# Patient Record
Sex: FEMALE | Race: OTHER | Employment: FULL TIME | URBAN - METROPOLITAN AREA
[De-identification: names, ages, dates, MRNs, and addresses within clinical notes are randomized per-mention and may not be internally consistent; named-entity substitution may affect disease eponyms.]

---

## 2021-11-01 ENCOUNTER — APPOINTMENT (OUTPATIENT)
Dept: CT IMAGING | Facility: HOSPITAL | Age: 51
End: 2021-11-01
Attending: EMERGENCY MEDICINE
Payer: COMMERCIAL

## 2021-11-01 ENCOUNTER — HOSPITAL ENCOUNTER (EMERGENCY)
Facility: HOSPITAL | Age: 51
Discharge: HOME | End: 2021-11-01
Attending: EMERGENCY MEDICINE
Payer: COMMERCIAL

## 2021-11-01 VITALS
RESPIRATION RATE: 16 BRPM | TEMPERATURE: 98 F | SYSTOLIC BLOOD PRESSURE: 133 MMHG | DIASTOLIC BLOOD PRESSURE: 92 MMHG | WEIGHT: 164 LBS | HEART RATE: 61 BPM | OXYGEN SATURATION: 96 %

## 2021-11-01 DIAGNOSIS — S02.842A CLOSED FRACTURE OF LATERAL WALL OF LEFT ORBIT, INITIAL ENCOUNTER (HCC): Primary | ICD-10-CM

## 2021-11-01 DIAGNOSIS — S02.402A: ICD-10-CM

## 2021-11-01 DIAGNOSIS — S06.0X0A CONCUSSION WITHOUT LOSS OF CONSCIOUSNESS, INITIAL ENCOUNTER: ICD-10-CM

## 2021-11-01 PROCEDURE — 99285 EMERGENCY DEPT VISIT HI MDM: CPT

## 2021-11-01 PROCEDURE — 70486 CT MAXILLOFACIAL W/O DYE: CPT | Performed by: EMERGENCY MEDICINE

## 2021-11-01 PROCEDURE — 96375 TX/PRO/DX INJ NEW DRUG ADDON: CPT

## 2021-11-01 PROCEDURE — 72125 CT NECK SPINE W/O DYE: CPT | Performed by: EMERGENCY MEDICINE

## 2021-11-01 PROCEDURE — 96374 THER/PROPH/DIAG INJ IV PUSH: CPT

## 2021-11-01 PROCEDURE — 96361 HYDRATE IV INFUSION ADD-ON: CPT

## 2021-11-01 PROCEDURE — 70450 CT HEAD/BRAIN W/O DYE: CPT | Performed by: EMERGENCY MEDICINE

## 2021-11-01 RX ORDER — HYDROCODONE BITARTRATE AND ACETAMINOPHEN 5; 325 MG/1; MG/1
1-2 TABLET ORAL EVERY 6 HOURS PRN
Qty: 10 TABLET | Refills: 0 | Status: SHIPPED | OUTPATIENT
Start: 2021-11-01 | End: 2021-11-08

## 2021-11-01 RX ORDER — DIAZEPAM 5 MG/ML
2.5 INJECTION, SOLUTION INTRAMUSCULAR; INTRAVENOUS ONCE
Status: COMPLETED | OUTPATIENT
Start: 2021-11-01 | End: 2021-11-01

## 2021-11-01 RX ORDER — ONDANSETRON 2 MG/ML
4 INJECTION INTRAMUSCULAR; INTRAVENOUS ONCE
Status: COMPLETED | OUTPATIENT
Start: 2021-11-01 | End: 2021-11-01

## 2021-11-01 RX ORDER — METHYLPREDNISOLONE SODIUM SUCCINATE 40 MG/ML
40 INJECTION, POWDER, LYOPHILIZED, FOR SOLUTION INTRAMUSCULAR; INTRAVENOUS ONCE
Status: COMPLETED | OUTPATIENT
Start: 2021-11-01 | End: 2021-11-01

## 2021-11-01 RX ORDER — MORPHINE SULFATE 4 MG/ML
4 INJECTION, SOLUTION INTRAMUSCULAR; INTRAVENOUS ONCE
Status: COMPLETED | OUTPATIENT
Start: 2021-11-01 | End: 2021-11-01

## 2021-11-01 RX ORDER — ONDANSETRON 4 MG/1
4 TABLET, ORALLY DISINTEGRATING ORAL EVERY 8 HOURS PRN
Qty: 10 TABLET | Refills: 0 | Status: SHIPPED | OUTPATIENT
Start: 2021-11-01 | End: 2021-11-08

## 2021-11-01 RX ORDER — MECLIZINE HYDROCHLORIDE 25 MG/1
25 TABLET ORAL 3 TIMES DAILY PRN
Qty: 30 TABLET | Refills: 0 | Status: SHIPPED | OUTPATIENT
Start: 2021-11-01

## 2021-11-01 RX ORDER — PREDNISONE 20 MG/1
TABLET ORAL
Qty: 14 TABLET | Refills: 0 | Status: SHIPPED | OUTPATIENT
Start: 2021-11-01

## 2021-11-01 NOTE — ED PROVIDER NOTES
Patient Seen in: [facility] AND [facility] Emergency Department      History   No chief complaint on file.     Stated Complaint: Fall    Subjective:   HPI    45year old female with no known medical history who presents with trauma to the left side of her face vigil movement present. Mouth/Throat:      Dentition: Dental caries present. Tongue: No lesions. Pharynx: Uvula midline.       Comments: Few broken teeth - pt states many were broken before the fall on the top  Eyes:      Conjunctiva/sclera: Cade Park MD on 11/01/2021 at 3:37 PM     Finalized by (CST): Cindy Taylor MD on 11/01/2021 at 3:40 PM          CT FACIAL BONES (HME=[id number])    Result Date: 11/1/2021  CONCLUSION:  1.  Acute left zygomaticomaxillary complex fracture, which includes mildly with above fractures. I discussed the case with Dr. Paolo Draper on-call for ENT, she is able to view the films.   She advises that this appears nonsurgical, patient should use prednisone to control the swelling, apply ice, come to see her in the office in a few Dizziness (vertigo). , Normal, Disp-30 tablet, R-0

## 2021-11-01 NOTE — ED INITIAL ASSESSMENT (HPI)
Patient arrives vomiting in triage.   Hit face on concrete today   bruising noted to face     Denies any drugs or alcohol use today

## 2021-11-05 ENCOUNTER — TELEPHONE (OUTPATIENT)
Dept: OTOLARYNGOLOGY | Facility: CLINIC | Age: 51
End: 2021-11-05

## 2021-11-05 ENCOUNTER — OFFICE VISIT (OUTPATIENT)
Dept: OTOLARYNGOLOGY | Facility: CLINIC | Age: 51
End: 2021-11-05
Payer: COMMERCIAL

## 2021-11-05 VITALS — BODY MASS INDEX: 32.2 KG/M2 | HEIGHT: 60 IN | WEIGHT: 164 LBS

## 2021-11-05 DIAGNOSIS — S02.401A CLOSED FRACTURE OF ZYGOMATIC COMPLEX, INITIAL ENCOUNTER (HCC): ICD-10-CM

## 2021-11-05 DIAGNOSIS — S02.30XA CLOSED FRACTURE OF ZYGOMATIC COMPLEX, INITIAL ENCOUNTER (HCC): ICD-10-CM

## 2021-11-05 DIAGNOSIS — S02.80XA CLOSED FRACTURE OF ZYGOMATIC COMPLEX, INITIAL ENCOUNTER (HCC): ICD-10-CM

## 2021-11-05 DIAGNOSIS — S02.402A CLOSED FRACTURE OF ZYGOMATIC COMPLEX, INITIAL ENCOUNTER (HCC): ICD-10-CM

## 2021-11-05 DIAGNOSIS — S02.40FA CLOSED FRACTURE OF LEFT ZYGOMATIC ARCH, INITIAL ENCOUNTER (HCC): Primary | ICD-10-CM

## 2021-11-05 PROCEDURE — 3008F BODY MASS INDEX DOCD: CPT | Performed by: OTOLARYNGOLOGY

## 2021-11-05 PROCEDURE — 99203 OFFICE O/P NEW LOW 30 MIN: CPT | Performed by: OTOLARYNGOLOGY

## 2021-11-05 RX ORDER — PREDNISONE 10 MG/1
TABLET ORAL
Qty: 44 TABLET | Refills: 0 | Status: SHIPPED | OUTPATIENT
Start: 2021-11-05

## 2021-11-05 RX ORDER — HYDROCODONE BITARTRATE AND ACETAMINOPHEN 5; 325 MG/1; MG/1
1 TABLET ORAL EVERY 6 HOURS PRN
Qty: 20 TABLET | Refills: 0 | Status: SHIPPED | OUTPATIENT
Start: 2021-11-05

## 2021-11-05 NOTE — TELEPHONE ENCOUNTER
Patient states she was seen earlier and was told medication was sent but pharmacy has not received it yet.        [facility] [place] [address], [phone], [phone]

## 2021-11-05 NOTE — PROGRESS NOTES
Juan Hines is a 45year old female. Patient presents with:  Facial Trauma: pt was at the er on 11/01 due to fall. pt states dropping off lunch at school and tripped over and landed on her left side on face .        HISTORY OF PRESENT ILLNESS  She present pigment change and rash. Hema/Lymph Negative Easy bleeding and easy bruising.            PHYSICAL EXAM    Ht 5' (1.524 m)   Wt 164 lb (74.4 kg)   BMI 32.03 kg/m²        Constitutional Normal Overall appearance - Normal.   Psychiatric Normal Orientation - tablet, Rfl: 0  •  ondansetron 4 MG Oral Tablet Dispersible, Take 1 tablet (4 mg total) by mouth every 8 (eight) hours as needed for Nausea., Disp: 10 tablet, Rfl: 0  •  meclizine 25 MG Oral Tab, Take 1 tablet (25 mg total) by mouth 3 (three) times daily a

## 2021-11-10 ENCOUNTER — TELEPHONE (OUTPATIENT)
Dept: OTOLARYNGOLOGY | Facility: CLINIC | Age: 51
End: 2021-11-10

## 2021-11-10 DIAGNOSIS — S02.40FA CLOSED FRACTURE OF LEFT ZYGOMATIC ARCH, INITIAL ENCOUNTER (HCC): Primary | ICD-10-CM

## 2021-11-10 NOTE — TELEPHONE ENCOUNTER
Patient has an outside [employer]. Authorization will need to be obtained by [address] [place] [address]. Referral has been canceled.

## 2021-11-11 NOTE — TELEPHONE ENCOUNTER
Portuguese Speaking Pt  After couple voice messages left today at her cell number. Pt returned my call. I explained Her   Patient has an outside [employer]. Authorization will need to be obtained by [address] [place] [address]. Referral has been canceled. No workers comp provided, [employer] didn't offer her to pay for it she said    Pt has an appt scheduled with her PCP and she will discuss her options to return to see Dr Joaquin Echeverria and to have surgery in this Hospital she said.   Will call her back tomorrow after 2pm.  Okay per [facility] surgery scheduler to hold on until tomorrow

## 2021-11-12 ENCOUNTER — TELEPHONE (OUTPATIENT)
Dept: OTOLARYNGOLOGY | Facility: CLINIC | Age: 51
End: 2021-11-12

## 2021-11-12 DIAGNOSIS — S02.40FA CLOSED FRACTURE OF LEFT ZYGOMATIC ARCH, INITIAL ENCOUNTER (HCC): Primary | ICD-10-CM

## 2022-01-27 ENCOUNTER — OFFICE VISIT (OUTPATIENT)
Dept: OTOLARYNGOLOGY | Facility: CLINIC | Age: 52
End: 2022-01-27
Payer: COMMERCIAL

## 2022-01-27 VITALS — BODY MASS INDEX: 32.2 KG/M2 | TEMPERATURE: 97 F | WEIGHT: 164 LBS | HEIGHT: 60 IN

## 2022-01-27 DIAGNOSIS — S02.80XA CLOSED FRACTURE OF ZYGOMATIC COMPLEX, INITIAL ENCOUNTER (HCC): ICD-10-CM

## 2022-01-27 DIAGNOSIS — S02.30XA CLOSED FRACTURE OF ZYGOMATIC COMPLEX, INITIAL ENCOUNTER (HCC): ICD-10-CM

## 2022-01-27 DIAGNOSIS — S02.401A CLOSED FRACTURE OF ZYGOMATIC COMPLEX, INITIAL ENCOUNTER (HCC): ICD-10-CM

## 2022-01-27 DIAGNOSIS — S02.402A CLOSED FRACTURE OF ZYGOMATIC COMPLEX, INITIAL ENCOUNTER (HCC): ICD-10-CM

## 2022-01-27 DIAGNOSIS — S02.40FA CLOSED FRACTURE OF LEFT ZYGOMATIC ARCH, INITIAL ENCOUNTER (HCC): Primary | ICD-10-CM

## 2022-01-27 PROCEDURE — 3008F BODY MASS INDEX DOCD: CPT | Performed by: OTOLARYNGOLOGY

## 2022-01-27 PROCEDURE — 99213 OFFICE O/P EST LOW 20 MIN: CPT | Performed by: OTOLARYNGOLOGY

## 2022-01-27 NOTE — PROGRESS NOTES
Alan Barahona is a 45year old female.   Patient presents with:  Jaw Pain: f/u per still slight pain when chewing on left side, numbness, per pt no more pain when talking ,       HISTORY OF PRESENT ILLNESS  She presents with a history of injury Sexual Activity      Alcohol use: Never      Drug use: Never      No family history on file. No past medical history on file.     Past Surgical History:   Procedure Laterality Date   • TONSILLECTOMY           REVIEW OF SYSTEMS    System Neg/Pos Details Nose/Mouth/Throat Normal External nose - Normal. Lips/teeth/gums - Normal. Tonsils - Normal. Oropharynx - Normal.   Nose/Mouth/Throat Normal Nares - Right: Normal Left: Normal. Septum -Normal  Turbinates - Right: Normal, Left: Normal.       Current Outpa these errors have been corrected. While every attempt is made to correct errors during dictation discrepancies may still exist    Niraj Welsh MD    1/27/2022    1:20 PM

## (undated) NOTE — LETTER
Date & Time: 11/1/2021, 6:46 PM  Patient: Le Healy  Encounter Provider(s):    Jey Cifuentes MD       To Whom It May Concern:    Le Healy was seen and treated in our department on 11/1/2021. She should not return to work until 11/8/2021.     If yo

## (undated) NOTE — LETTER
11/5/2021            To Whom It May Concern:    Eulogio Parmar is currently under my medical care and may not return to work at this time   Magalys Youssef will be having a surgery done 11/15/21 with     Any questions feel free to call our office at  [phone]